# Patient Record
Sex: FEMALE | Race: WHITE | NOT HISPANIC OR LATINO | Employment: OTHER | URBAN - METROPOLITAN AREA
[De-identification: names, ages, dates, MRNs, and addresses within clinical notes are randomized per-mention and may not be internally consistent; named-entity substitution may affect disease eponyms.]

---

## 2017-01-25 ENCOUNTER — ALLSCRIPTS OFFICE VISIT (OUTPATIENT)
Dept: OTHER | Facility: OTHER | Age: 82
End: 2017-01-25

## 2017-02-01 ENCOUNTER — ALLSCRIPTS OFFICE VISIT (OUTPATIENT)
Dept: OTHER | Facility: OTHER | Age: 82
End: 2017-02-01

## 2017-02-16 ENCOUNTER — ALLSCRIPTS OFFICE VISIT (OUTPATIENT)
Dept: OTHER | Facility: OTHER | Age: 82
End: 2017-02-16

## 2017-04-20 ENCOUNTER — GENERIC CONVERSION - ENCOUNTER (OUTPATIENT)
Dept: OTHER | Facility: OTHER | Age: 82
End: 2017-04-20

## 2017-06-05 ENCOUNTER — ALLSCRIPTS OFFICE VISIT (OUTPATIENT)
Dept: OTHER | Facility: OTHER | Age: 82
End: 2017-06-05

## 2017-07-07 ENCOUNTER — TRANSCRIBE ORDERS (OUTPATIENT)
Dept: LAB | Facility: CLINIC | Age: 82
End: 2017-07-07

## 2017-07-07 ENCOUNTER — APPOINTMENT (OUTPATIENT)
Dept: LAB | Facility: CLINIC | Age: 82
End: 2017-07-07
Payer: MEDICARE

## 2017-07-07 DIAGNOSIS — M85.80 OTHER SPECIFIED DISORDERS OF BONE DENSITY AND STRUCTURE, UNSPECIFIED SITE: ICD-10-CM

## 2017-07-07 DIAGNOSIS — I65.29 OCCLUSION AND STENOSIS OF UNSPECIFIED CAROTID ARTERY: ICD-10-CM

## 2017-07-07 DIAGNOSIS — I50.23 ACUTE ON CHRONIC SYSTOLIC CONGESTIVE HEART FAILURE (HCC): ICD-10-CM

## 2017-07-07 DIAGNOSIS — E78.00 PURE HYPERCHOLESTEROLEMIA: Primary | ICD-10-CM

## 2017-07-07 DIAGNOSIS — E78.00 PURE HYPERCHOLESTEROLEMIA: ICD-10-CM

## 2017-07-07 DIAGNOSIS — N28.9 DISORDER OF KIDNEY AND URETER: ICD-10-CM

## 2017-07-07 DIAGNOSIS — I10 ESSENTIAL (PRIMARY) HYPERTENSION: ICD-10-CM

## 2017-07-07 LAB
ALBUMIN SERPL BCP-MCNC: 3.9 G/DL (ref 3.5–5)
ALP SERPL-CCNC: 110 U/L (ref 46–116)
ALT SERPL W P-5'-P-CCNC: 16 U/L (ref 12–78)
ANION GAP SERPL CALCULATED.3IONS-SCNC: 8 MMOL/L (ref 4–13)
AST SERPL W P-5'-P-CCNC: 11 U/L (ref 5–45)
BASOPHILS # BLD AUTO: 0.02 THOUSANDS/ΜL (ref 0–0.1)
BASOPHILS NFR BLD AUTO: 0 % (ref 0–1)
BILIRUB SERPL-MCNC: 0.59 MG/DL (ref 0.2–1)
BUN SERPL-MCNC: 29 MG/DL (ref 5–25)
CALCIUM SERPL-MCNC: 9.1 MG/DL (ref 8.3–10.1)
CHLORIDE SERPL-SCNC: 103 MMOL/L (ref 100–108)
CHOLEST SERPL-MCNC: 131 MG/DL (ref 50–200)
CO2 SERPL-SCNC: 23 MMOL/L (ref 21–32)
CREAT SERPL-MCNC: 1.48 MG/DL (ref 0.6–1.3)
EOSINOPHIL # BLD AUTO: 0.15 THOUSAND/ΜL (ref 0–0.61)
EOSINOPHIL NFR BLD AUTO: 3 % (ref 0–6)
ERYTHROCYTE [DISTWIDTH] IN BLOOD BY AUTOMATED COUNT: 14 % (ref 11.6–15.1)
GFR SERPL CREATININE-BSD FRML MDRD: 33.1 ML/MIN/1.73SQ M
GLUCOSE P FAST SERPL-MCNC: 90 MG/DL (ref 65–99)
HCT VFR BLD AUTO: 36.1 % (ref 34.8–46.1)
HDLC SERPL-MCNC: 59 MG/DL (ref 40–60)
HGB BLD-MCNC: 12 G/DL (ref 11.5–15.4)
LDLC SERPL CALC-MCNC: 52 MG/DL (ref 0–100)
LYMPHOCYTES # BLD AUTO: 1.21 THOUSANDS/ΜL (ref 0.6–4.47)
LYMPHOCYTES NFR BLD AUTO: 23 % (ref 14–44)
MCH RBC QN AUTO: 29.1 PG (ref 26.8–34.3)
MCHC RBC AUTO-ENTMCNC: 33.2 G/DL (ref 31.4–37.4)
MCV RBC AUTO: 87 FL (ref 82–98)
MONOCYTES # BLD AUTO: 0.53 THOUSAND/ΜL (ref 0.17–1.22)
MONOCYTES NFR BLD AUTO: 10 % (ref 4–12)
NEUTROPHILS # BLD AUTO: 3.46 THOUSANDS/ΜL (ref 1.85–7.62)
NEUTS SEG NFR BLD AUTO: 64 % (ref 43–75)
NRBC BLD AUTO-RTO: 0 /100 WBCS
PLATELET # BLD AUTO: 190 THOUSANDS/UL (ref 149–390)
PMV BLD AUTO: 9.9 FL (ref 8.9–12.7)
POTASSIUM SERPL-SCNC: 4.8 MMOL/L (ref 3.5–5.3)
PROT SERPL-MCNC: 7.7 G/DL (ref 6.4–8.2)
RBC # BLD AUTO: 4.13 MILLION/UL (ref 3.81–5.12)
SODIUM SERPL-SCNC: 134 MMOL/L (ref 136–145)
TRIGL SERPL-MCNC: 100 MG/DL
WBC # BLD AUTO: 5.38 THOUSAND/UL (ref 4.31–10.16)

## 2017-07-07 PROCEDURE — 80061 LIPID PANEL: CPT

## 2017-07-07 PROCEDURE — 85025 COMPLETE CBC W/AUTO DIFF WBC: CPT

## 2017-07-07 PROCEDURE — 36415 COLL VENOUS BLD VENIPUNCTURE: CPT

## 2017-07-07 PROCEDURE — 80053 COMPREHEN METABOLIC PANEL: CPT

## 2017-07-11 ENCOUNTER — GENERIC CONVERSION - ENCOUNTER (OUTPATIENT)
Dept: OTHER | Facility: OTHER | Age: 82
End: 2017-07-11

## 2017-07-17 ENCOUNTER — GENERIC CONVERSION - ENCOUNTER (OUTPATIENT)
Dept: OTHER | Facility: OTHER | Age: 82
End: 2017-07-17

## 2017-07-19 ENCOUNTER — GENERIC CONVERSION - ENCOUNTER (OUTPATIENT)
Dept: OTHER | Facility: OTHER | Age: 82
End: 2017-07-19

## 2017-07-28 ENCOUNTER — OFFICE VISIT (OUTPATIENT)
Dept: URGENT CARE | Facility: CLINIC | Age: 82
End: 2017-07-28
Payer: MEDICARE

## 2017-07-28 PROCEDURE — 12011 RPR F/E/E/N/L/M 2.5 CM/<: CPT

## 2017-07-28 PROCEDURE — G0463 HOSPITAL OUTPT CLINIC VISIT: HCPCS

## 2017-07-28 PROCEDURE — 99203 OFFICE O/P NEW LOW 30 MIN: CPT

## 2017-08-09 ENCOUNTER — OFFICE VISIT (OUTPATIENT)
Dept: URGENT CARE | Facility: CLINIC | Age: 82
End: 2017-08-09
Payer: MEDICARE

## 2017-08-09 PROCEDURE — 99213 OFFICE O/P EST LOW 20 MIN: CPT

## 2017-08-09 PROCEDURE — G0463 HOSPITAL OUTPT CLINIC VISIT: HCPCS

## 2017-08-10 ENCOUNTER — GENERIC CONVERSION - ENCOUNTER (OUTPATIENT)
Dept: OTHER | Facility: OTHER | Age: 82
End: 2017-08-10

## 2017-08-11 ENCOUNTER — GENERIC CONVERSION - ENCOUNTER (OUTPATIENT)
Dept: OTHER | Facility: OTHER | Age: 82
End: 2017-08-11

## 2017-08-18 ENCOUNTER — ALLSCRIPTS OFFICE VISIT (OUTPATIENT)
Dept: OTHER | Facility: OTHER | Age: 82
End: 2017-08-18

## 2017-08-18 DIAGNOSIS — R60.9 EDEMA: ICD-10-CM

## 2017-08-18 DIAGNOSIS — N18.2 CHRONIC KIDNEY DISEASE, STAGE II (MILD): ICD-10-CM

## 2017-08-21 ENCOUNTER — APPOINTMENT (OUTPATIENT)
Dept: LAB | Facility: CLINIC | Age: 82
End: 2017-08-21
Payer: MEDICARE

## 2017-08-21 ENCOUNTER — TRANSCRIBE ORDERS (OUTPATIENT)
Dept: RADIOLOGY | Facility: CLINIC | Age: 82
End: 2017-08-21

## 2017-08-21 DIAGNOSIS — R60.9 EDEMA: ICD-10-CM

## 2017-08-21 DIAGNOSIS — N18.2 CHRONIC KIDNEY DISEASE, STAGE II (MILD): ICD-10-CM

## 2017-08-21 LAB
BASOPHILS # BLD AUTO: 0.02 THOUSANDS/ΜL (ref 0–0.1)
BASOPHILS NFR BLD AUTO: 1 % (ref 0–1)
DEPRECATED D DIMER PPP: 9516 NG/ML (FEU) (ref 0–424)
EOSINOPHIL # BLD AUTO: 0.15 THOUSAND/ΜL (ref 0–0.61)
EOSINOPHIL NFR BLD AUTO: 4 % (ref 0–6)
ERYTHROCYTE [DISTWIDTH] IN BLOOD BY AUTOMATED COUNT: 16.7 % (ref 11.6–15.1)
HCT VFR BLD AUTO: 29 % (ref 34.8–46.1)
HGB BLD-MCNC: 9.4 G/DL (ref 11.5–15.4)
LYMPHOCYTES # BLD AUTO: 0.59 THOUSANDS/ΜL (ref 0.6–4.47)
LYMPHOCYTES NFR BLD AUTO: 14 % (ref 14–44)
MCH RBC QN AUTO: 29.8 PG (ref 26.8–34.3)
MCHC RBC AUTO-ENTMCNC: 32.4 G/DL (ref 31.4–37.4)
MCV RBC AUTO: 92 FL (ref 82–98)
MONOCYTES # BLD AUTO: 0.47 THOUSAND/ΜL (ref 0.17–1.22)
MONOCYTES NFR BLD AUTO: 11 % (ref 4–12)
NEUTROPHILS # BLD AUTO: 2.92 THOUSANDS/ΜL (ref 1.85–7.62)
NEUTS SEG NFR BLD AUTO: 70 % (ref 43–75)
NRBC BLD AUTO-RTO: 0 /100 WBCS
PLATELET # BLD AUTO: 232 THOUSANDS/UL (ref 149–390)
PMV BLD AUTO: 9.8 FL (ref 8.9–12.7)
RBC # BLD AUTO: 3.15 MILLION/UL (ref 3.81–5.12)
WBC # BLD AUTO: 4.17 THOUSAND/UL (ref 4.31–10.16)

## 2017-08-21 PROCEDURE — 85025 COMPLETE CBC W/AUTO DIFF WBC: CPT

## 2017-08-21 PROCEDURE — 36415 COLL VENOUS BLD VENIPUNCTURE: CPT

## 2017-08-21 PROCEDURE — 86162 COMPLEMENT TOTAL (CH50): CPT

## 2017-08-21 PROCEDURE — 85379 FIBRIN DEGRADATION QUANT: CPT

## 2017-08-22 ENCOUNTER — GENERIC CONVERSION - ENCOUNTER (OUTPATIENT)
Dept: OTHER | Facility: OTHER | Age: 82
End: 2017-08-22

## 2017-08-22 LAB — CH50 SERPL-ACNC: 61 U/ML (ref 42–60)

## 2017-08-24 ENCOUNTER — ALLSCRIPTS OFFICE VISIT (OUTPATIENT)
Dept: OTHER | Facility: OTHER | Age: 82
End: 2017-08-24

## 2017-09-14 ENCOUNTER — GENERIC CONVERSION - ENCOUNTER (OUTPATIENT)
Dept: OTHER | Facility: OTHER | Age: 82
End: 2017-09-14

## 2017-09-14 ENCOUNTER — GENERIC CONVERSION - ENCOUNTER (OUTPATIENT)
Dept: FAMILY MEDICINE CLINIC | Facility: CLINIC | Age: 82
End: 2017-09-14

## 2017-10-04 ENCOUNTER — ALLSCRIPTS OFFICE VISIT (OUTPATIENT)
Dept: OTHER | Facility: OTHER | Age: 82
End: 2017-10-04

## 2017-11-01 ENCOUNTER — TRANSCRIBE ORDERS (OUTPATIENT)
Dept: LAB | Facility: CLINIC | Age: 82
End: 2017-11-01

## 2017-11-01 ENCOUNTER — APPOINTMENT (OUTPATIENT)
Dept: LAB | Facility: CLINIC | Age: 82
End: 2017-11-01
Payer: MEDICARE

## 2017-11-01 DIAGNOSIS — N18.30 CHRONIC KIDNEY DISEASE, STAGE III (MODERATE) (HCC): ICD-10-CM

## 2017-11-01 DIAGNOSIS — N17.9 ACUTE RENAL FAILURE, UNSPECIFIED ACUTE RENAL FAILURE TYPE (HCC): Primary | ICD-10-CM

## 2017-11-01 DIAGNOSIS — N17.9 ACUTE RENAL FAILURE, UNSPECIFIED ACUTE RENAL FAILURE TYPE (HCC): ICD-10-CM

## 2017-11-01 LAB
ANION GAP SERPL CALCULATED.3IONS-SCNC: 7 MMOL/L (ref 4–13)
BUN SERPL-MCNC: 51 MG/DL (ref 5–25)
CALCIUM SERPL-MCNC: 8.8 MG/DL (ref 8.3–10.1)
CHLORIDE SERPL-SCNC: 105 MMOL/L (ref 100–108)
CO2 SERPL-SCNC: 24 MMOL/L (ref 21–32)
CREAT SERPL-MCNC: 2.65 MG/DL (ref 0.6–1.3)
CREAT UR-MCNC: 103 MG/DL
GFR SERPL CREATININE-BSD FRML MDRD: 15 ML/MIN/1.73SQ M
GLUCOSE P FAST SERPL-MCNC: 92 MG/DL (ref 65–99)
MICROALBUMIN UR-MCNC: 33.6 MG/L (ref 0–20)
MICROALBUMIN/CREAT 24H UR: 33 MG/G CREATININE (ref 0–30)
POTASSIUM SERPL-SCNC: 5.7 MMOL/L (ref 3.5–5.3)
SODIUM SERPL-SCNC: 136 MMOL/L (ref 136–145)

## 2017-11-01 PROCEDURE — 36415 COLL VENOUS BLD VENIPUNCTURE: CPT

## 2017-11-01 PROCEDURE — 82043 UR ALBUMIN QUANTITATIVE: CPT | Performed by: INTERNAL MEDICINE

## 2017-11-01 PROCEDURE — 80048 BASIC METABOLIC PNL TOTAL CA: CPT

## 2017-11-01 PROCEDURE — 82570 ASSAY OF URINE CREATININE: CPT | Performed by: INTERNAL MEDICINE

## 2017-11-06 ENCOUNTER — GENERIC CONVERSION - ENCOUNTER (OUTPATIENT)
Dept: OTHER | Facility: OTHER | Age: 82
End: 2017-11-06

## 2017-11-09 ENCOUNTER — ALLSCRIPTS OFFICE VISIT (OUTPATIENT)
Dept: OTHER | Facility: OTHER | Age: 82
End: 2017-11-09

## 2017-11-09 DIAGNOSIS — E87.5 HYPERKALEMIA: ICD-10-CM

## 2017-11-10 NOTE — PROGRESS NOTES
Assessment    1  Hyperkalemia (276 7) (E87 5)   2  Chronic kidney disease (CKD), stage II (mild) (585 2) (N18 2)   3  Acute on chronic systolic congestive heart failure (428 23,428 0) (I50 23)   4  Constipation (564 00) (K59 00)   5  GERD without esophagitis (530 81) (K21 9)    Plan  Acute on chronic systolic congestive heart failure, Arteriosclerotic cardiovascular disease, Chronickidney disease (CKD), stage II (mild), Hypertension    · Isosorbide Mononitrate ER 30 MG Oral Tablet Extended Release 24 Hour; Take 1 tablettwice daily  Acute on chronic systolic congestive heart failure, Hypertension    · From  Carvedilol 25 MG Oral Tablet TAKE 1 TABLET TWICE DAILY,  WITH MORNING ANDEVENING MEAL To Carvedilol 25 MG Oral Tablet TAKE 1/2 TABLET TWICE DAILY,  WITH MORNINGAND EVENING MEAL  Acute on chronic systolic congestive heart failure, Hypertension, Stage 1 chronic kidney disease    · Lisinopril 10 MG Oral Tablet; Take 1 tablet twice daily   · NIFEdipine ER 30 MG Oral Tablet Extended Release 24 Hour; TAKE 1 TABLET 3 times daily  Chronic kidney disease (CKD), stage II (mild), Hypertension    · Spironolactone 25 MG Oral Tablet  Hyperkalemia    · (1) POTASSIUM; Status:Active; Requested for:09Nov2017;   Hypertension    · Furosemide 40 MG Oral Tablet; TAKE 1 TABLET BY MOUTH ONCE DAILY  Osteopenia    · * DXA BONE DENSITY SPINE HIP AND PELVIS; Status:Permanent Deferral - Parent/Guardian Refuses;  Screening for genitourinary condition    · *VB - Urinary Incontinence Screen (Dx Z13 89 Screen for UI); Status:Complete - RetrospectiveAuthorization;   Done: 03OYN4185 01:19PM  Screening for neurological condition    · *VB - Fall Risk Assessment  (Dx Z13 89 Screen for Neurologic Disorder); Status:Complete -Retrospective Authorization;   Done: 04EYR8264 01:18PM    Discussion/Summary  Discussion Summary:   The patient will follow up in the next several weeks and see how things go     Counseling Documentation With Imm: The patient was counseled regarding impressions  Chief Complaint  Chief Complaint Free Text Note Form: Pt presents today for RTN  Labs are up to date  Declines Dexa  History of Present Illness  HPI: The patient's BMP noted a marked declined in renal function and an increase in the K+  She was placed on Spirolactone  The patient states that she is having some orthostasis vagal symptoms and we reviewed the medications and noted that the patient is on Coreg  We discussed decreasing the dose of the medication  The patient states that there are no other concerns  She notes relief of her GERD symptoms with the Ranitidine  Review of Systems  Complete-Female:  Constitutional: no fever,-- not feeling poorly,-- no recent weight gain,-- no chills-- and-- not feeling tired  Eyes: No complaints of eye pain, no red eyes, no eyesight problems, no discharge, no dry eyes, no itching of eyes  ENT: no sore throat-- and-- no nasal discharge  Cardiovascular: no chest pain,-- no intermittent leg claudication,-- no palpitations-- and-- no lower extremity edema  Respiratory: no shortness of breath,-- no cough,-- no orthopnea-- and-- no shortness of breath during exertion  Gastrointestinal: no abdominal pain,-- no nausea-- and-- no vomiting  Genitourinary: No complaints of dysuria, no incontinence, no pelvic pain, no dysmenorrhea, no vaginal discharge or bleeding  Musculoskeletal: no arthralgias-- and-- no myalgias  Neurological: dizziness, but-- no headache,-- no numbness-- and-- no tingling  Psychiatric: not suicidal,-- no anxiety-- and-- no depression  Active Problems  1  Abnormal gait (781 2) (R26 9)   2  Acute on chronic systolic congestive heart failure (428 23,428 0) (I50 23)   3  Acute sinusitis (461 9) (J01 90)   4  Allergic rhinitis (477 9) (J30 9)   5  Aneurysm of abdominal aorta (441 4) (I71 4)   6  Arteriosclerotic cardiovascular disease (429 2,440 9) (I25 10)   7  Carotid stenosis (433 10) (I65 29)   8   Chronic kidney disease (CKD), stage II (mild) (585 2) (N18 2)   9  Chronic sinus infection (473 9) (J32 9)   10  Colitis (558 9) (K52 9)   11  Colon, diverticulosis (562 10) (K57 30)   12  Constipation (564 00) (K59 00)   13  Depression screen (V79 0) (Z13 89)   14  Diverticulosis (562 10) (K57 90)   15  Edema (782 3) (R60 9)   16  Encounter for screening mammogram for malignant neoplasm of breast (V76 12) (Z12 31)   17  GERD without esophagitis (530 81) (K21 9)   18  Herpes zoster (053 9) (B02 9)   19  Hypercholesterolemia (272 0) (E78 00)   20  Hypertension (401 9) (I10)   21  Hyponatremia (276 1) (E87 1)   22  Laceration of face (873 40) (S01 81XA)   23  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)   24  Need for influenza vaccination (V04 81) (Z23)   25  Need for pneumococcal vaccination (V03 82) (Z23)   26  Need for pneumococcal vaccine (V03 82) (Z23)   27  Need for tetanus booster (V03 7) (Z23)   28  Orthopnea (786 02) (R06 01)   29  Osteopenia (733 90) (M85 80)   30  Panic Disorder Without Agoraphobia (300 01)   31  Recurrent deep vein thrombosis, unspecified laterality (453 40) (I82 409)   32  Screening for colon cancer (V76 51) (Z12 11)   33  Screening for diabetes mellitus (V77 1) (Z13 1)   34  Screening for genitourinary condition (V81 6) (Z13 89)   35  Screening for neurological condition (V80 09) (Z13 89)   36  Secondary peripheral vascular disease (443 81) (I73 9)   37  Shortness of breath (786 05) (R06 02)   38  Stage 1 chronic kidney disease (585 1) (N18 1)   39  Transient ischemic attack (435 9) (G45 9)   40  Urinary tract infection (599 0) (N39 0)    Past Medical History  1  History of Debility (799 3) (R53 81)   2  History of Eye exam, routine (V72 0) (Z01 00)   3  History of abnormal weight loss (V13 89) (Z87 898)   4  History of epistaxis (V12 69) (Z87 898)   5  Denied: History of mental disorder   6  Denied: History of substance abuse    Surgical History  1   History of Carotid Thromboendarterectomy   2  History of  Section   3  History of Cholecystotomy    Family History  Mother    1  Denied: Family history of mental disorder   2  Denied: Family history of substance abuse   3  Family history of Hypertension (V17 49)  Father    4  Denied: Family history of mental disorder   5  Denied: Family history of substance abuse   6  Family history of Hypertension (V17 49)    Social History     · Caffeine Use   · Never A Smoker   · Non-smoker (V49 89) (Z78 9)   · Uses Safety Equipment - Seatbelts    Current Meds   1  Atorvastatin Calcium 40 MG Oral Tablet; TAKE 1 TABLET DAILY AS DIRECTED; Therapy: 30UXP3258 to (Dirk Melissa)  Requested for: 72Egj4469; Last Rx:50Pzf3192 Ordered   2  Carvedilol 25 MG Oral Tablet; TAKE 1 TABLET TWICE DAILY,  WITH MORNING AND EVENING MEAL; Therapy: 70NAE6223 to [phone])  Requested for: 56Xip5284; Last Rx:05Vxu3873 Ordered   3  Clopidogrel Bisulfate 75 MG Oral Tablet; TAKE 1 TABLET DAILY  Requested for: 76EYK0471; Last Rx:88Hyc8945 Ordered   4  Eliquis 5 MG Oral Tablet; one tab po bid; Therapy: 31Ldh4613 to (Evaluate:2018)  Requested for: 97Zsu4977; Last Rx:07Kzb9252 Ordered   5  Furosemide 40 MG Oral Tablet; TAKE 1 TABLET BY MOUTH ONCE DAILY  Requested for: 50Gza7910; Last Rx:23Wsv1924 Ordered   6  Isosorbide Mononitrate ER 30 MG Oral Tablet Extended Release 24 Hour; Take 1 tablet twice daily; Therapy: 57XUE0105 to (Evaluate:2018)  Requested for: 2017; Last Rx:63Tnp5456 Ordered   7  Lisinopril 10 MG Oral Tablet; Take 1 tablet twice daily  Requested for: 12Lns2277; Last Rx:55Iyt1551; Status: ACTIVE - Transmit to Pharmacy - Awaiting Verification Ordered   8  NIFEdipine ER 30 MG Oral Tablet Extended Release 24 Hour; TAKE 1 TABLET 3 times daily; Therapy: 08GGP7485 to (Evaluate:02Kaq0822)  Requested for: 42Ihq9008; Last Rx:63Tmz8800 Ordered   9  RaNITidine HCl - 300 MG Oral Tablet; TAKE 1 TABLET TWICE DAILY;  Therapy: 74TLA8712 to (Betzaida Lange)  Requested for: 28HSH3286; Last HX:00LQC3883 Ordered   10  Spironolactone 25 MG Oral Tablet; TAKE 1 TABLET DAILY; Therapy: 39TYU8688 to (Myrtie Soda)  Requested for: 39Xbb5602; Last Rx:15Prj6076  Ordered  Medication List Reviewed: The medication list was reviewed and updated today  Allergies  1  Keflex TABS   2  Shellfish-derived Products   3  Sulfa Drugs    Vitals  Vital Signs    Recorded: 18YVE6266 01:11PM   Temperature 96 5 F   Heart Rate 56   Respiration 16   Systolic 624   Diastolic 60   Height 4 ft 8 in   Weight 101 lb    BMI Calculated 22 64   BSA Calculated 1 33   O2 Saturation 96       Physical Exam   Constitutional  General appearance: No acute distress, well appearing and well nourished  Eyes  Conjunctiva and lids: No swelling, erythema or discharge  Pulmonary  Respiratory effort: No increased work of breathing or signs of respiratory distress  Auscultation of lungs: Clear to auscultation  Auscultation of the lungs revealed no expiratory wheezing,-- normal expiratory time-- and-- no inspiratory wheezing  no rales or crackles were heard bilaterally  no rhonchi  no friction rub  no wheezing  no diminished breath sounds  no bronchial breath sounds  Cardiovascular  Auscultation of heart: Normal rate and rhythm, normal S1 and S2, without murmurs  Examination of extremities for edema and/or varicosities: Normal    Carotid pulses: Normal    Abdomen  Abdomen: Non-tender, no masses  Lymphatic  Palpation of lymph nodes in neck: No lymphadenopathy  Musculoskeletal  Gait and station: Normal    Skin  Skin and subcutaneous tissue: Normal without rashes or lesions  Neurologic  Cranial nerves: Cranial nerves 2-12 intact     Psychiatric  Mood and affect: Normal          Results/Data  *VB - Urinary Incontinence Screen (Dx Z13 89 Screen for UI) 38YGV3685 01:19PM [place] [facility]     Test Name Result Flag Reference   Urinary Incontinence Assessment 11/9/17       *VB - Fall Risk Assessment  (Dx Z13 89 Screen for Neurologic Disorder) 01MSU0728 01:18PM [facility]     Test Name Result Flag Reference   Falls Risk      Falls with injury in the past year         Signatures   Electronically signed by : Brenna Delatorre DO; Nov 9 2017  2:10PM EST                       (Author)

## 2017-11-14 ENCOUNTER — GENERIC CONVERSION - ENCOUNTER (OUTPATIENT)
Dept: OTHER | Facility: OTHER | Age: 82
End: 2017-11-14

## 2017-11-20 ENCOUNTER — TRANSCRIBE ORDERS (OUTPATIENT)
Dept: LAB | Facility: CLINIC | Age: 82
End: 2017-11-20

## 2017-11-20 ENCOUNTER — APPOINTMENT (OUTPATIENT)
Dept: LAB | Facility: CLINIC | Age: 82
End: 2017-11-20
Payer: MEDICARE

## 2017-11-20 DIAGNOSIS — E87.5 HYPERKALEMIA: ICD-10-CM

## 2017-11-20 DIAGNOSIS — N18.30 CHRONIC KIDNEY DISEASE, STAGE III (MODERATE) (HCC): ICD-10-CM

## 2017-11-20 DIAGNOSIS — N17.9 ACUTE RENAL FAILURE, UNSPECIFIED ACUTE RENAL FAILURE TYPE (HCC): ICD-10-CM

## 2017-11-20 DIAGNOSIS — N17.9 ACUTE RENAL FAILURE, UNSPECIFIED ACUTE RENAL FAILURE TYPE (HCC): Primary | ICD-10-CM

## 2017-11-20 LAB
ANION GAP SERPL CALCULATED.3IONS-SCNC: 8 MMOL/L (ref 4–13)
BUN SERPL-MCNC: 54 MG/DL (ref 5–25)
CALCIUM SERPL-MCNC: 8.8 MG/DL (ref 8.3–10.1)
CHLORIDE SERPL-SCNC: 108 MMOL/L (ref 100–108)
CO2 SERPL-SCNC: 23 MMOL/L (ref 21–32)
CREAT SERPL-MCNC: 2.47 MG/DL (ref 0.6–1.3)
GFR SERPL CREATININE-BSD FRML MDRD: 17 ML/MIN/1.73SQ M
GLUCOSE P FAST SERPL-MCNC: 102 MG/DL (ref 65–99)
POTASSIUM SERPL-SCNC: 5.6 MMOL/L (ref 3.5–5.3)
SODIUM SERPL-SCNC: 139 MMOL/L (ref 136–145)

## 2017-11-20 PROCEDURE — 80048 BASIC METABOLIC PNL TOTAL CA: CPT

## 2017-11-20 PROCEDURE — 36415 COLL VENOUS BLD VENIPUNCTURE: CPT

## 2017-12-04 ENCOUNTER — GENERIC CONVERSION - ENCOUNTER (OUTPATIENT)
Dept: OTHER | Facility: OTHER | Age: 82
End: 2017-12-04

## 2017-12-12 ENCOUNTER — GENERIC CONVERSION - ENCOUNTER (OUTPATIENT)
Dept: FAMILY MEDICINE CLINIC | Facility: CLINIC | Age: 82
End: 2017-12-12

## 2018-01-09 NOTE — MISCELLANEOUS
History of Present Illness  TCM Communication [facility]: The patient is being contacted for 7-21-17  She was hospitalized [facility]  The date of admission: 7-14-17, date of discharge: 7-15-17  Diagnosis: Congestive Heart Failure  She was discharged to home  Communication performed and completed by Tobi Every      Active Problems    1  Abnormal weight loss (783 21) (R63 4)   2  Acute on chronic systolic congestive heart failure (428 23,428 0) (I50 23)   3  Acute sinusitis (461 9) (J01 90)   4  Allergic rhinitis (477 9) (J30 9)   5  Aneurysm of abdominal aorta (441 4) (I71 4)   6  Arteriosclerotic cardiovascular disease (429 2,440 9) (I25 10)   7  Carotid stenosis (433 10) (I65 29)   8  Chronic kidney disease (CKD), stage II (mild) (585 2) (N18 2)   9  Chronic sinus infection (473 9) (J32 9)   10  Colitis (558 9) (K52 9)   11  Colon, diverticulosis (562 10) (K57 30)   12  Constipation (564 00) (K59 00)   13  Debility (799 3) (R53 81)   14  Depression screen (V79 0) (Z13 89)   15  Diverticulosis (562 10) (K57 90)   16  Encounter for screening mammogram for malignant neoplasm of breast (V76 12)    (Z12 31)   17  Epistaxis (784 7) (R04 0)   18  Eye exam, routine (V72 0) (Z01 00)   19  Herpes zoster (053 9) (B02 9)   20  Hypercholesterolemia (272 0) (E78 00)   21  Hypertension (401 9) (I10)   22  Hyponatremia (276 1) (E87 1)   23  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)   24  Need for influenza vaccination (V04 81) (Z23)   25  Need for pneumococcal vaccination (V03 82) (Z23)   26  Need for pneumococcal vaccine (V03 82) (Z23)   27  Need for tetanus booster (V03 7) (Z23)   28  Osteopenia (733 90) (M85 80)   29  Panic Disorder Without Agoraphobia (300 01)   30  Screening for colon cancer (V76 51) (Z12 11)   31  Screening for diabetes mellitus (V77 1) (Z13 1)   32  Screening for genitourinary condition (V81 6) (Z13 89)   33   Screening for neurological condition (V80 09) (Z13 89)   34  Secondary peripheral vascular disease (443 81) (I73 9)   35  Stage 1 chronic kidney disease (585 1) (N18 1)   36  Transient ischemic attack (435 9) (G45 9)   37  Urinary tract infection (599 0) (N39 0)    Past Medical History    1  Denied: History of mental disorder   2  Denied: History of substance abuse    Surgical History    1  History of Carotid Thromboendarterectomy   2  History of  Section   3  History of Cholecystotomy    Family History  Mother    1  Denied: Family history of mental disorder   2  Denied: Family history of substance abuse   3  Family history of Hypertension (V17 49)  Father    4  Denied: Family history of mental disorder   5  Denied: Family history of substance abuse   6  Family history of Hypertension (V17 49)    Social History    · Caffeine Use   · Never A Smoker   · Uses Safety Equipment - Seatbelts    Current Meds   1  AmLODIPine Besylate 10 MG Oral Tablet; take one tablet by mouth every day; Therapy: 51CQH3244 to (Verlean Prom)  Requested for: 07TSA5135; Last   Rx:2017 Ordered   2  Atorvastatin Calcium 40 MG Oral Tablet; TAKE 1 TABLET DAILY AS DIRECTED; Therapy: 02GZK2491 to (Verlean Prom)  Requested for: 54PJK8234; Last   Rx:2017 Ordered   3  Baby Aspirin 81 MG CHEW; CHEW AND SWALLOW 1 TABLET DAILY; Therapy: (Recorded:03Oyc2769) to Recorded   4  CloNIDine HCl - 0 2 MG Oral Tablet; TAKE 1 TABLET TWICE DAILY; Therapy: 95NLW9992 to (Verlean Prom)  Requested for: 38UQP5721; Last   Rx:2017 Ordered   5  Furosemide 40 MG Oral Tablet; TAKE 1 TABLET BY MOUTH ONCE DAILY  Requested for:   30DCO3727; Last Rx:2017 Ordered   6  Isosorbide Mononitrate ER 30 MG Oral Tablet Extended Release 24 Hour; TAKE 1   TABLET DAILY  Requested for: 47FGD1610; Last Rx:2017 Ordered   7  Lisinopril 10 MG Oral Tablet; TAKE 1 TABLET DAILY  Requested for: 67PGL9451; Last   Rx:2017 Ordered   8   Metoprolol Succinate ER 25 MG Oral Tablet Extended Release 24 Hour; Take 1 tablet   twice daily  Requested for: 55YKH9053; Last Rx:62Oyu3417 Ordered    Allergies    1  Keflex TABS   2  Shellfish-derived Products   3  Sulfa Drugs    Future Appointments    Date/Time Provider Specialty Site   07/19/2017 02:45 PM ZAINA Bentley   Internal Medicine [address] [facility]     Signatures   Electronically signed by : Marla Murdock, ; Jul 17 2017 12:15PM EST                       (Author)    Electronically signed by : ZAINA Trotter ; Jul 26 2017  4:44PM EST

## 2018-01-11 NOTE — RESULT NOTES
Verified Results  (1) BASIC METABOLIC PROFILE 42LDE0256 02:16PM Lani Ring Order Number: [id number]  TW Order Number: [id number]     Test Name Result Flag Reference   GLUCOSE,RANDM 147 mg/dL H    If the patient is fasting, the ADA then defines impaired fasting glucose as > 100 mg/dL and diabetes as > or equal to 123 mg/dL  SODIUM 132 mmol/L L 136-145   POTASSIUM 4 8 mmol/L  3 5-5 3   CHLORIDE 97 mmol/L L 100-108   CARBON DIOXIDE 26 mmol/L  21-32   ANION GAP (CALC) 9 mmol/L  4-13   BLOOD UREA NITROGEN 20 mg/dL  5-25   CREATININE 1 10 mg/dL  0 60-1 30   Standardized to IDMS reference method   CALCIUM 8 5 mg/dL  8 3-10 1   eGFR Non-African American 46 7 ml/min/1 73sq m     [facility] [employer] Disease Education Program recommendations are as follows:  GFR calculation is accurate only with a steady state creatinine  Chronic Kidney disease less than 60 ml/min/1 73 sq  meters  Kidney failure less than 15 ml/min/1 73 sq  meters

## 2018-01-12 VITALS
HEIGHT: 56 IN | WEIGHT: 105.5 LBS | HEART RATE: 60 BPM | DIASTOLIC BLOOD PRESSURE: 60 MMHG | RESPIRATION RATE: 16 BRPM | BODY MASS INDEX: 23.73 KG/M2 | TEMPERATURE: 97.8 F | SYSTOLIC BLOOD PRESSURE: 156 MMHG

## 2018-01-12 NOTE — RESULT NOTES
Verified Results  (1) CBC/PLT/DIFF 01YUJ4956 08:43AM Garrett Nyhan Order Number: [id number]   Order Number: [id number]     Test Name Result Flag Reference   WBC COUNT 5 38 Thousand/uL  4 31-10 16   RBC COUNT 4 13 Million/uL  3 81-5 12   HEMOGLOBIN 12 0 g/dL  11 5-15 4   HEMATOCRIT 36 1 %  34 8-46  1   MCV 87 fL  82-98   MCH 29 1 pg  26 8-34 3   MCHC 33 2 g/dL  31 4-37 4   RDW 14 0 %  11 6-15 1   MPV 9 9 fL  8 9-12 7   PLATELET COUNT 360 Thousands/uL  149-390   nRBC AUTOMATED 0 /100 WBCs     NEUTROPHILS RELATIVE PERCENT 64 %  43-75   LYMPHOCYTES RELATIVE PERCENT 23 %  14-44   MONOCYTES RELATIVE PERCENT 10 %  4-12   EOSINOPHILS RELATIVE PERCENT 3 %  0-6   BASOPHILS RELATIVE PERCENT 0 %  0-1   NEUTROPHILS ABSOLUTE COUNT 3 46 Thousands/? ??L  1 85-7 62   LYMPHOCYTES ABSOLUTE COUNT 1 21 Thousands/? ??L  0 60-4 47   MONOCYTES ABSOLUTE COUNT 0 53 Thousand/? ??L  0 17-1 22   EOSINOPHILS ABSOLUTE COUNT 0 15 Thousand/? ??L  0 00-0 61   BASOPHILS ABSOLUTE COUNT 0 02 Thousands/? ??L  0 00-0 10   - Patient Instructions: This bloodwork is non-fasting  Please drink two glasses of water morning of bloodwork  - Patient Instructions: This bloodwork is non-fasting  Please drink two glasses of water morning of bloodwork       (1) COMPREHENSIVE METABOLIC PANEL 66UIM9159 21:83IY Rhys Kid     Test Name Result Flag Reference   SODIUM 134 mmol/L L 136-145   POTASSIUM 4 8 mmol/L  3 5-5 3   CHLORIDE 103 mmol/L  100-108   CARBON DIOXIDE 23 mmol/L  21-32   ANION GAP (CALC) 8 mmol/L  4-13   BLOOD UREA NITROGEN 29 mg/dL H 5-25   CREATININE 1 48 mg/dL H 0 60-1 30   Standardized to IDMS reference method   CALCIUM 9 1 mg/dL  8 3-10 1   BILI, TOTAL 0 59 mg/dL  0 20-1 00   ALK PHOSPHATAS 110 U/L     ALT (SGPT) 16 U/L  12-78   AST(SGOT) 11 U/L  5-45   ALBUMIN 3 9 g/dL  3 5-5 0   TOTAL PROTEIN 7 7 g/dL  6 4-8 2   eGFR Non-African American 33 1 ml/min/1 73sq m     [facility] Energy Disease Education Program recommendations are as follows:  GFR calculation is accurate only with a steady state creatinine  Chronic Kidney disease less than 60 ml/min/1 73 sq  meters  Kidney failure less than 15 ml/min/1 73 sq  meters  GLUCOSE FASTING 90 mg/dL  65-99     (1) LIPID PANEL, FASTING 67VNZ8060 08:43AM [facility]     Test Name Result Flag Reference   CHOLESTEROL 131 mg/dL     HDL,DIRECT 59 mg/dL  40-60   Specimen collection should occur prior to Metamizole administration due to the potential for falsely depressed results  LDL CHOLESTEROL CALCULATED 52 mg/dL  0-100   This is a fasting blood test  Water,black tea or black  coffee only after 9:00pm the night before test  Drink 2 glasses of water the morning of test         Triglyceride:         Normal              <150 mg/dl       Borderline High    150-199 mg/dl       High               200-499 mg/dl       Very High          >499 mg/dl  Cholesterol:         Desirable        <200 mg/dl      Borderline High  200-239 mg/dl      High             >239 mg/dl  HDL Cholesterol:        High    >59 mg/dL      Low     <41 mg/dL  LDL CALCULATED:    This screening LDL is a calculated result  It does not have the accuracy of the Direct Measured LDL in the monitoring of patients with hyperlipidemia and/or statin therapy  Direct Measure LDL (VYW532) must be ordered separately in these patients  TRIGLYCERIDES 100 mg/dL  <=150   Specimen collection should occur prior to N-Acetylcysteine or Metamizole administration due to the potential for falsely depressed results

## 2018-01-12 NOTE — MISCELLANEOUS
Assessment    1  Edema (782 3) (R60 9)   2  Chronic kidney disease (CKD), stage II (mild) (585 2) (N18 2)   3  Hypercholesterolemia (272 0) (E78 00)   4  Hypertension (401 9) (I10)   5  Stage 1 chronic kidney disease (585 1) (N18 1)    Plan  Acute on chronic systolic congestive heart failure, Arteriosclerotic cardiovascular  disease, Chronic kidney disease (CKD), stage II (mild), Hypertension    · Isosorbide Mononitrate ER 30 MG Oral Tablet Extended Release 24 Hour; Take 1 tablet twice daily   Rx By: Ebenezer Truong; Dispense: 30 Days ; #:60 Tablet Extended Release 24 Hour; Refill: 5; For: Acute on chronic systolic congestive heart failure, Arteriosclerotic cardiovascular disease, Chronic kidney disease (CKD), stage II (mild), Hypertension; SHAE = N; Verified Transmission to [facility]  Acute on chronic systolic congestive heart failure, Hypertension, Stage 1 chronic kidney  disease    · NIFEdipine ER 30 MG Oral Tablet Extended Release 24 Hour; TAKE 1 TABLET  3 times daily   Rx By: Ebenezer Truong; Dispense: 30 Days ; #:90 Tablet Extended Release 24 Hour; Refill: 5; For: Acute on chronic systolic congestive heart failure, Hypertension, Stage 1 chronic kidney disease; SHAE = N; Verified Transmission to [facility]  Carotid stenosis    · Atorvastatin Calcium 40 MG Oral Tablet; TAKE 1 TABLET DAILY AS DIRECTED   Rx By: Ebenezer Truong; Dispense: 90 Days ; #:90 Tablet; Refill: 3; For: Carotid stenosis; SHAE = N; Verified Transmission to [facility]  Chronic kidney disease (CKD), stage II (mild)    · (1) CBC/PLT/DIFF; Status:Active; Requested for:89Ltz9892;    Perform:[facility]; Due:40Vjv5986; Ordered; For:Chronic kidney disease (CKD), stage II (mild); Ordered By:Arthur Acuña;   · (1) CH50 COMPLEMENT; Status:Active; Requested for:39Zan0755;    Perform:[facility]; Due:92Wan5128; Ordered; For:Chronic kidney disease (CKD), stage II (mild);  Ordered By:Arianne Adolph Villagran;  Chronic kidney disease (CKD), stage II (mild), Hypertension    · Spironolactone 25 MG Oral Tablet; TAKE 1 TABLET DAILY   Rx By: Trent Braun; Dispense: 30 Days ; #:30 Tablet; Refill: 5; For: Chronic kidney disease (CKD), stage II (mild), Hypertension; SHAE = N; Verified Transmission to [facility]  Edema    · (1) D-DIMER; Status:Active; Requested for:75Xgn6355;    Perform:[facility]; Due:24Yoh3440; Ordered;  Roseanne Baumann; Ordered By:Adolph Acuña; Hypertension    · Furosemide 40 MG Oral Tablet; TAKE 1 TABLET BY MOUTH ONCE DAILY   Rx By: Trent Braun; Dispense: 90 Days ; #:90 Tablet; Refill: 3; For: Hypertension; SHAE = N; Verified Transmission to [facility]  Unlinked    · Clopidogrel Bisulfate 75 MG Oral Tablet   Dispense: 0 Days ; #: Sufficient TABS; Refill: 0; SHAE = N; Record; Last Updated By: Trent Braun; 8/9/2017 4:55:55 PM    Discussion/Summary  Discussion Summary:   The patient will followup in 2 weeks and we will see how things go  Counseling Documentation With Imm: The patient was counseled regarding impressions  Chief Complaint  Chief Complaint Free Text Note Form: Pt here for f/u from [facility] admission for SOB   Most of her meds were changed due to kidney disease  Pt on O2 mostly at night  Pt continues with swollen legs and ankles  Pt feels very tired  Pt fell in ICU, has contusion above left eye  History of Present Illness  TCM Communication [facility]: The patient is being contacted for follow-up after hospitalization and 08/18/2017  Hospital records were not available  She was hospitalized at [facility]  The dates of hospitalization:, date of admission: 07/30/2017, date of discharge: 08/02/2017  Diagnosis: CHF/SOB  She was discharged to home  She scheduled a follow up appointment     Communication performed and completed by  8/03/2017   HPI: The patient was readmitted to the hospital after an apparent decompensation on her prior visit  The patient was noted to have O2 desaturations while in the hospital and was started on O2 that she wears at night  The patient states that there are no other issues at this time  The patient states that she has had some bloating, but notes that she has been using Miralax and this has mad a difference with regular BM since started  Review of Systems  Complete-Female:   Constitutional: no fever, not feeling poorly, no recent weight gain and not feeling tired  Eyes: no dryness of the eyes and no purulent discharge from the eyes  ENT: no earache, no nosebleeds, no sore throat and no nasal discharge  Cardiovascular: lower extremity edema, but the heart rate was not slow, no chest pain, no intermittent leg claudication and no palpitations  Respiratory: no shortness of breath, no cough, no orthopnea and no shortness of breath during exertion  Gastrointestinal: no abdominal pain, no nausea, no vomiting and no diarrhea  Genitourinary: No complaints of dysuria, no incontinence, no pelvic pain, no dysmenorrhea, no vaginal discharge or bleeding  Musculoskeletal: no arthralgias and no myalgias  Neurological: no headache, no numbness, no tingling and no dizziness  Psychiatric: Not suicidal, no sleep disturbance, no anxiety or depression, no change in personality, no emotional problems  Active Problems    1  Acute on chronic systolic congestive heart failure (428 23,428 0) (I50 23)   2  Acute sinusitis (461 9) (J01 90)   3  Allergic rhinitis (477 9) (J30 9)   4  Aneurysm of abdominal aorta (441 4) (I71 4)   5  Arteriosclerotic cardiovascular disease (429 2,440 9) (I25 10)   6  Carotid stenosis (433 10) (I65 29)   7  Chronic kidney disease (CKD), stage II (mild) (585 2) (N18 2)   8  Chronic sinus infection (473 9) (J32 9)   9  Colitis (558 9) (K52 9)   10  Colon, diverticulosis (562 10) (K57 30)   11  Constipation (564 00) (K59 00)   12  Depression screen (V79 0) (Z13 89)   13   Diverticulosis (562 10) (K57 90)   14  Encounter for screening mammogram for malignant neoplasm of breast (V76 12)    (Z12 31)   15  Herpes zoster (053 9) (B02 9)   16  Hypercholesterolemia (272 0) (E78 00)   17  Hypertension (401 9) (I10)   18  Hyponatremia (276 1) (E87 1)   19  Laceration of face (873 40) (S01 81XA)   20  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)   21  Need for influenza vaccination (V04 81) (Z23)   22  Need for pneumococcal vaccination (V03 82) (Z23)   23  Need for pneumococcal vaccine (V03 82) (Z23)   24  Need for tetanus booster (V03 7) (Z23)   25  Osteopenia (733 90) (M85 80)   26  Panic Disorder Without Agoraphobia (300 01)   27  Screening for colon cancer (V76 51) (Z12 11)   28  Screening for diabetes mellitus (V77 1) (Z13 1)   29  Screening for genitourinary condition (V81 6) (Z13 89)   30  Screening for neurological condition (V80 09) (Z13 89)   31  Secondary peripheral vascular disease (443 81) (I73 9)   32  Stage 1 chronic kidney disease (585 1) (N18 1)   33  Transient ischemic attack (435 9) (G45 9)   34  Urinary tract infection (599 0) (N39 0)    Past Medical History    1  History of Debility (799 3) (R53 81)   2  History of Eye exam, routine (V72 0) (Z01 00)   3  History of abnormal weight loss (V13 89) (Z87 898)   4  History of epistaxis (V12 69) (Z87 898)   5  Denied: History of mental disorder   6  Denied: History of substance abuse    Surgical History    1  History of Carotid Thromboendarterectomy   2  History of  Section   3  History of Cholecystotomy    Family History  Mother    1  Denied: Family history of mental disorder   2  Denied: Family history of substance abuse   3  Family history of Hypertension (V17 49)  Father    4  Denied: Family history of mental disorder   5  Denied: Family history of substance abuse   6   Family history of Hypertension (V17 49)    Social History    · Caffeine Use   · Never A Smoker   · Non-smoker (V49 89) (Z78 9)   · Uses Safety Equipment - Seatbelts    Current Meds   1  Atorvastatin Calcium 40 MG Oral Tablet; TAKE 1 TABLET DAILY AS DIRECTED; Therapy: 64JSK5973 to (Karson Nhi)  Requested for: 16RNU4542; Last   Rx:05Jun2017 Ordered   2  Baby Aspirin 81 MG CHEW; CHEW AND SWALLOW 1 TABLET DAILY; Therapy: (Recorded:23Xoq7043) to Recorded   3  Clopidogrel Bisulfate 75 MG Oral Tablet; Therapy: (Liz Juarez) to Recorded   4  Furosemide 40 MG Oral Tablet; TAKE 1 TABLET BY MOUTH ONCE DAILY  Requested for:   96IMN3458; Last Rx:05Jun2017 Ordered   5  Plavix 75 MG Oral Tablet; Therapy: (Recorded:53Dar1374) to Recorded  Medication List Reviewed: The medication list was reviewed and updated today  Allergies    1  Keflex TABS   2  Shellfish-derived Products   3  Sulfa Drugs    Vitals  Signs   Recorded: 20Gor0046 03:06PM   Temperature: 96 9 F, Tympanic  Heart Rate: 64  Respiration: 22  Systolic: 394, LUE, Sitting  Diastolic: 70, LUE, Sitting  Height: 4 ft 8 in  Weight: 106 lb 4 oz  BMI Calculated: 23 82  BSA Calculated: 1 36  O2 Saturation: 94, RA    Physical Exam    Constitutional   General appearance: No acute distress, well appearing and well nourished  Eyes   Conjunctiva and lids: No swelling, erythema or discharge  Ears, Nose, Mouth, and Throat   Otoscopic examination: Tympanic membranes translucent with normal light reflex  Canals patent without erythema  Nasal mucosa, septum, and turbinates: Normal without edema or erythema  Oropharynx: Normal with no erythema, edema, exudate or lesions  Pulmonary   Respiratory effort: No increased work of breathing or signs of respiratory distress  Auscultation of lungs: Clear to auscultation  Auscultation of the lungs revealed no expiratory wheezing, normal expiratory time and no inspiratory wheezing  no rales or crackles were heard bilaterally  no rhonchi  no friction rub  no wheezing  no diminished breath sounds  no bronchial breath sounds     Cardiovascular Auscultation of heart: Normal rate and rhythm, normal S1 and S2, without murmurs  Examination of extremities for edema and/or varicosities: Normal     Carotid pulses: Normal     Abdomen   Abdomen: Non-tender, no masses  Liver and spleen: No hepatomegaly or splenomegaly  Lymphatic   Palpation of lymph nodes in neck: No lymphadenopathy  Musculoskeletal   Gait and station: Normal     Skin   Skin and subcutaneous tissue: Normal without rashes or lesions  Neurologic   Cranial nerves: Cranial nerves 2-12 intact      Psychiatric   Mood and affect: Normal          Signatures   Electronically signed by : Vicki Joiner DO; Aug 18 2017  4:13PM EST                       (Author)

## 2018-01-13 VITALS
DIASTOLIC BLOOD PRESSURE: 60 MMHG | OXYGEN SATURATION: 96 % | SYSTOLIC BLOOD PRESSURE: 144 MMHG | BODY MASS INDEX: 22.72 KG/M2 | HEART RATE: 56 BPM | TEMPERATURE: 96.5 F | RESPIRATION RATE: 16 BRPM | WEIGHT: 101 LBS | HEIGHT: 56 IN

## 2018-01-13 NOTE — RESULT NOTES
Verified Results  (1) BASIC METABOLIC PROFILE 66EWU6231 02:33PM Lorri Lara     Test Name Result Flag Reference   GLUCOSE,RANDM 133 mg/dL     If the patient is fasting, the ADA then defines impaired fasting glucose as > 100 mg/dL and diabetes as > or equal to 123 mg/dL  SODIUM 137 mmol/L  136-145   POTASSIUM 4 7 mmol/L  3 5-5 3   CHLORIDE 105 mmol/L  100-108   CARBON DIOXIDE 26 mmol/L  21-32   ANION GAP (CALC) 6 mmol/L  4-13   BLOOD UREA NITROGEN 28 mg/dL H 5-25   CREATININE 1 46 mg/dL H 0 60-1 30   Standardized to IDMS reference method   CALCIUM 8 8 mg/dL  8 3-10 1   eGFR Non-African American 33 7 ml/min/1 73sq [facility] [employer] Disease Education Program recommendations are as follows:  GFR calculation is accurate only with a steady state creatinine  Chronic Kidney disease less than 60 ml/min/1 73 sq  meters  Kidney failure less than 15 ml/min/1 73 sq  meters

## 2018-01-14 VITALS
HEART RATE: 64 BPM | RESPIRATION RATE: 22 BRPM | WEIGHT: 106.25 LBS | TEMPERATURE: 96.9 F | DIASTOLIC BLOOD PRESSURE: 70 MMHG | HEIGHT: 56 IN | OXYGEN SATURATION: 94 % | SYSTOLIC BLOOD PRESSURE: 150 MMHG | BODY MASS INDEX: 23.9 KG/M2

## 2018-01-14 VITALS
RESPIRATION RATE: 16 BRPM | WEIGHT: 108.38 LBS | HEART RATE: 60 BPM | SYSTOLIC BLOOD PRESSURE: 168 MMHG | TEMPERATURE: 96.6 F | HEIGHT: 56 IN | DIASTOLIC BLOOD PRESSURE: 84 MMHG | BODY MASS INDEX: 24.38 KG/M2

## 2018-01-14 VITALS
DIASTOLIC BLOOD PRESSURE: 68 MMHG | SYSTOLIC BLOOD PRESSURE: 146 MMHG | BODY MASS INDEX: 24.16 KG/M2 | RESPIRATION RATE: 16 BRPM | WEIGHT: 107.38 LBS | TEMPERATURE: 97.3 F | HEIGHT: 56 IN | HEART RATE: 60 BPM

## 2018-01-14 VITALS
DIASTOLIC BLOOD PRESSURE: 66 MMHG | SYSTOLIC BLOOD PRESSURE: 160 MMHG | HEART RATE: 52 BPM | WEIGHT: 106.5 LBS | HEIGHT: 56 IN | BODY MASS INDEX: 23.96 KG/M2 | TEMPERATURE: 96.6 F | RESPIRATION RATE: 14 BRPM

## 2018-01-14 VITALS
TEMPERATURE: 96.8 F | HEART RATE: 44 BPM | WEIGHT: 100.38 LBS | DIASTOLIC BLOOD PRESSURE: 52 MMHG | BODY MASS INDEX: 22.58 KG/M2 | RESPIRATION RATE: 16 BRPM | HEIGHT: 56 IN | SYSTOLIC BLOOD PRESSURE: 110 MMHG

## 2018-01-15 NOTE — MISCELLANEOUS
Assessment    1  Debility (799 3) (R53 81)   2  Chronic kidney disease, stage I (585 1) (N18 1)   3  Hypertension (401 9) (I10)   4  Acute on chronic systolic congestive heart failure (428 23,428 0) (I50 23)    Plan  Acute renal insufficiency    · (1) BASIC METABOLIC PROFILE; Status:Active; Requested for:92Etk0346;    Perform:[facility]; ZUA:48UYM3028;UKSEPVR; For:Acute renal insufficiency; Ordered By:Anthony Acuña; Arteriosclerotic cardiovascular disease    · Clopidogrel Bisulfate 75 MG Oral Tablet; TAKE 1 TABLET DAILY   Rx By: Noemi Kramer; Dispense: 90 Days ; #:90 Tablet; Refill: 3; For: Arteriosclerotic cardiovascular disease; SHAE = N; Verified Transmission to [facility]  Carotid stenosis    · Atorvastatin Calcium 40 MG Oral Tablet; TAKE 1 TABLET DAILY AS DIRECTED   Rx By: Noemi Kramer; Dispense: 90 Days ; #:90 Tablet; Refill: 3; For: Carotid stenosis; SHAE = N; Verified Transmission to [facility]  Debility    · Physical Therapy Follow Up Evaluation and Treatment  Follow-up  Status: Hold For -  Scheduling  Requested for: 15SRX3847   Ordered; For: Debility; Ordered By: Noemi Kramer Performed:  Due: 79ZMV4221  Hypertension    · CloNIDine HCl - 0 1 MG Oral Tablet   Rx By: Noemi Kramer; Dispense: 90 Days ; #:180 Tablet; Refill: 3; For: Hypertension; SHAE = N; Verified Transmission to [facility]; Last Updated By: Amado Luna; 5/16/2016 1:22:55 PM   · AmLODIPine Besylate 10 MG Oral Tablet; take one tablet by mouth every day   Rx By: Noemi Kramer; Dispense: 90 Days ; #:90 Tablet; Refill: 3; For: Hypertension; SHAE = N; Verified Transmission to [facility]  Unlinked    · Baby Aspirin 81 MG CHEW; CHEW AND SWALLOW 1 TABLET DAILY   Dispense: 0 Days ; #: Sufficient Tablet Chewable; Refill: 0; SHAE = N; Record;  Last Updated By: Noemi Kramer; 5/16/2016 1:34:39 PM   · Furosemide 20 MG Oral Tablet; TAKE 1 TABLET DAILY   Dispense: 30 Days ; #:30 Tablet; Refill: 0; SHAE = N; Record; Last Updated By: Anaya Gould; 5/16/2016 1:34:39 PM   · Isosorbide Mononitrate ER 30 MG Oral Tablet Extended Release 24 Hour;  TAKE 1 TABLET DAILY   Dispense: 0 Days ; #: Sufficient Tablet Extended Release 24 Hour; Refill: 0; SHAE = N; Record; Last Updated By: Anaya Gould; 5/16/2016 1:34:39 PM   · Lisinopril 5 MG Oral Tablet; take 2 tablet daily   Dispense: 0 Days ; #: Sufficient Tablet; Refill: 0; SHAE = N; Record; Last Updated By: Anaya Gould; 5/16/2016 1:34:39 PM   · Metoprolol Succinate ER 25 MG Oral Tablet Extended Release 24 Hour; TAKE  1 TABLET DAILY   Dispense: 90 Days ; #:90 Tablet Extended Release 24 Hour; Refill: 3; SHAE = N; Record; Last Updated By: Anaya Gould; 5/16/2016 1:34:39 PM    Discussion/Summary  Discussion Summary:   The patient's Echo noted an EF of 35%  The patient will continue to follow her weight on a regular basis  We offered her cardiac rehab and she wants PT for her gait problems  Counseling Documentation With Imm: The patient was counseled regarding diagnostic results, impressions  Chief Complaint  Chief Complaint Free Text Note Form: Pt is here for a follow up from [facility] 05/05/2016-05/07/2016  Pt states she went to [facility] and was diagnosed with an MI  After pt was discharged she was very SOB and went to [facility]  Pt states she was diagnosed with another MI  History of Present Illness  TCM Communication [facility]: The patient is being contacted for 05/16/16  Hospital records were reviewed and records were not available  She was hospitalized [facility]  The dates of hospitalization: 05/05/2016-05/07/2016  Diagnosis: CHF  She was discharged to home  Medications reviewed and updated today  She scheduled a follow up appointment  Frequent falls Counseling was provided to the patient  Communication performed and completed by Mady Goldberg, MA   HPI: The patient was admitted to [facility] with the diagnosis of CHF/Chest Pain   She was discharged with NaCl tablets and was noted to have decompensated shortly after returning home  The patient states that she has no SOB, orthopnea or conversational dyspnea  e reviewed the medications and we noted that they were reasonable  Review of Systems  Complete-Female:   Constitutional: no fever, not feeling poorly and not feeling tired  Eyes: no purulent discharge from the eyes  ENT: no sore throat and no nasal discharge  Cardiovascular: no chest pain and no palpitations  Respiratory: no shortness of breath, no cough and no shortness of breath during exertion  Gastrointestinal: constipation, but no abdominal pain, no nausea, no vomiting and no diarrhea  Genitourinary: No complaints of dysuria, no incontinence, no pelvic pain, no dysmenorrhea, no vaginal discharge or bleeding  Musculoskeletal: no arthralgias  Integumentary: No complaints of skin rash or lesions, no itching, no skin wounds, no breast pain or lump  Neurological: no headache, no numbness, no tingling and no dizziness  Psychiatric: no anxiety and no depression  Endocrine: No complaints of proptosis, no hot flashes, no muscle weakness, no deepening of the voice, no feelings of weakness  Hematologic/Lymphatic: No complaints of swollen glands, no swollen glands in the neck, does not bleed easily, does not bruise easily  Active Problems    1  Abnormal weight loss (783 21) (R63 4)   2  Acute renal insufficiency (593 9) (N28 9)   3  Acute sinusitis (461 9) (J01 90)   4  Allergic rhinitis (477 9) (J30 9)   5  Aneurysm of abdominal aorta (441 4) (I71 4)   6  Arteriosclerotic cardiovascular disease (429 2,440 9) (I25 10)   7  Carotid stenosis (433 10) (I65 29)   8  Chronic kidney disease, stage I (585 1) (N18 1)   9  Colitis (558 9) (K52 9)   10  Colon, diverticulosis (562 10) (K57 30)   11  Constipation (564 00) (K59 00)   12  Diverticulosis (562 10) (K57 90)   13   Encounter for screening mammogram for malignant neoplasm of breast (V76 12)    (Z12 31) 14  Eye exam, routine (V72 0) (Z01 00)   15  Herpes zoster (053 9) (B02 9)   16  Hypercholesterolemia (272 0) (E78 0)   17  Hypertension (401 9) (I10)   18  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)   19  Need for influenza vaccination (V04 81) (Z23)   20  Need for pneumococcal vaccination (V03 82) (Z23)   21  Need for pneumococcal vaccine (V03 82) (Z23)   22  Need for tetanus booster (V03 7) (Z23)   23  Osteopenia (733 90) (M85 80)   24  Panic Disorder Without Agoraphobia (300 01)   25  Screening for diabetes mellitus (V77 1) (Z13 1)   26  Screening for genitourinary condition (V81 6) (Z13 89)   27  Secondary peripheral vascular disease (443 81) (I73 9)   28  Transient ischemic attack (435 9) (G45 9)   29  Urinary tract infection (599 0) (N39 0)    Surgical History    1  History of Carotid Thromboendarterectomy   2  History of  Section   3  History of Cholecystotomy  Surgical History Reviewed: The surgical history was reviewed and updated today  Family History  Mother    1  Family history of Hypertension (V17 49)  Father    2  Family history of Hypertension (V17 49)  Family History Reviewed: The family history was reviewed and updated today  Social History    · Caffeine Use   · Never A Smoker   · Uses Safety Equipment - Seatbelts  Social History Reviewed: The social history was reviewed and updated today  The social history was reviewed and is unchanged  Current Meds   1  AmLODIPine Besylate 10 MG Oral Tablet; take one tablet by mouth every day; Therapy: 09AVY1750 to (Evaluate:93Yxl3892)  Requested for: 95FTZ9044; Last   Rx:07Kmc2647 Ordered   2  Atorvastatin Calcium 40 MG Oral Tablet; TAKE 1 TABLET DAILY AS DIRECTED; Therapy: 77ZQF9076 to (Evaluate:2017)  Requested for: 00QFM7149; Last   Rx:2016 Ordered   3  Baby Aspirin 81 MG CHEW; CHEW AND SWALLOW 1 TABLET DAILY; Therapy: (Recorded:12Wjr4847) to Recorded   4   CloNIDine HCl - 0 1 MG Oral Tablet; TAKE 1 TABLET TWICE DAILY; Therapy: 92KCE7415 to (Mono Urban)  Requested for: 73Fag1684; Last   Rx:04Tqc6693 Ordered   5  Clopidogrel Bisulfate 75 MG Oral Tablet; TAKE 1 TABLET DAILY; Therapy: 74VWN6607 to (Evaluate:19Mar2017)  Requested for: 22LWH2085; Last   Rx:24Mar2016 Ordered   6  Fish Oil 1000 MG Oral Capsule; TAKE 1 CAPSULE DAILY; Therapy: (Recorded:47Aww7146) to Recorded   7  Furosemide 20 MG Oral Tablet; TAKE 1 TABLET DAILY; Therapy: (Recorded:45Tun3362) to Recorded   8  Isosorbide Mononitrate ER 30 MG Oral Tablet Extended Release 24 Hour; TAKE 1   TABLET DAILY; Therapy: (Recorded:20Iwe1869) to Recorded   9  Levocetirizine Dihydrochloride 5 MG Oral Tablet; TAKE ONE TABLET BY MOUTH ONCE   DAILY; Therapy: 70VSI8674 to (Evaluate:19Mar2017)  Requested for: 56HPG7162; Last   Rx:24Mar2016 Ordered   10  Lisinopril 5 MG Oral Tablet; take 2 tablet daily; Therapy: (Recorded:04Yjw7169) to Recorded   11  Metoprolol Succinate ER 25 MG Oral Tablet Extended Release 24 Hour; TAKE 1 TABLET    DAILY; Therapy: (Recorded:72Bjj2138) to Recorded  Medication List Reviewed: The medication list was reviewed and updated today  Allergies    1  Keflex TABS   2  Shellfish-derived Products   3  Sulfa Drugs    Vitals  Signs [Data Includes: Current Encounter]   Recorded: 14GXY3850 01:22PM   Temperature: 98 7 F  Heart Rate: 86  Respiration: 18  Systolic: 829  Diastolic: 68  Height: 4 ft 8 in  Weight: 101 lb 4 00 oz  BMI Calculated: 22 7  BSA Calculated: 1 33  O2 Saturation: 97    Physical Exam    Constitutional   General appearance: No acute distress, well appearing and well nourished  Eyes   Conjunctiva and lids: No swelling, erythema or discharge  Ears, Nose, Mouth, and Throat   Otoscopic examination: Tympanic membranes translucent with normal light reflex  Canals patent without erythema  Nasal mucosa, septum, and turbinates: Normal without edema or erythema      Oropharynx: Normal with no erythema, edema, exudate or lesions  Pulmonary   Respiratory effort: No increased work of breathing or signs of respiratory distress  Auscultation of lungs: Clear to auscultation  Auscultation of the lungs revealed no expiratory wheezing, normal expiratory time and no inspiratory wheezing  no rales or crackles were heard bilaterally  no rhonchi  no friction rub  no wheezing  no diminished breath sounds  no bronchial breath sounds  Cardiovascular   Auscultation of heart: Normal rate and rhythm, normal S1 and S2, without murmurs  Examination of extremities for edema and/or varicosities: Normal     Carotid pulses: Normal     Abdomen   Abdomen: Non-tender, no masses  Lymphatic   Palpation of lymph nodes in neck: No lymphadenopathy  Musculoskeletal   Gait and station: Normal     Skin   Skin and subcutaneous tissue: Normal without rashes or lesions  Neurologic   Cranial nerves: Cranial nerves 2-12 intact      Psychiatric   Mood and affect: Normal          Future Appointments    Date/Time Provider Specialty Site   09/26/2016 11:00 AM Marko Bush DO Internal Medicine [address] [facility]     Signatures   Electronically signed by : Lindsey Anderson DO; May 16 2016  2:13PM EST                       (Author)

## 2018-01-16 NOTE — RESULT NOTES
Verified Results  (1) CREATININE 62PKT1007 12:21PM Luis Carlos Ly Order Number: [id number]     Test Name Result Flag Reference   CREATININE 1 38 mg/dL H 0 60-1 30   Standardized to IDMS reference method   eGFR Non-African American 35 9 ml/min/1 73sq m     National Kidney Disease Education Program recommendations are as follows:  GFR calculation is accurate only with a steady state creatinine  Chronic Kidney disease less than 60 ml/min/1 73 sq  meters  Kidney failure less than 15 ml/min/1 73 sq  meters

## 2018-01-22 VITALS
HEIGHT: 56 IN | DIASTOLIC BLOOD PRESSURE: 60 MMHG | BODY MASS INDEX: 22.61 KG/M2 | RESPIRATION RATE: 16 BRPM | OXYGEN SATURATION: 97 % | SYSTOLIC BLOOD PRESSURE: 134 MMHG | HEART RATE: 50 BPM | TEMPERATURE: 96.9 F | WEIGHT: 100.5 LBS

## 2018-01-22 VITALS — SYSTOLIC BLOOD PRESSURE: 122 MMHG | DIASTOLIC BLOOD PRESSURE: 46 MMHG

## 2018-01-24 VITALS
WEIGHT: 105 LBS | HEART RATE: 62 BPM | TEMPERATURE: 96.7 F | OXYGEN SATURATION: 97 % | BODY MASS INDEX: 23.62 KG/M2 | RESPIRATION RATE: 16 BRPM | DIASTOLIC BLOOD PRESSURE: 56 MMHG | SYSTOLIC BLOOD PRESSURE: 164 MMHG | HEIGHT: 56 IN